# Patient Record
Sex: MALE | Race: WHITE | NOT HISPANIC OR LATINO | URBAN - METROPOLITAN AREA
[De-identification: names, ages, dates, MRNs, and addresses within clinical notes are randomized per-mention and may not be internally consistent; named-entity substitution may affect disease eponyms.]

---

## 2020-06-23 ENCOUNTER — OUTPATIENT (OUTPATIENT)
Dept: OUTPATIENT SERVICES | Facility: HOSPITAL | Age: 70
LOS: 1 days | End: 2020-06-23
Payer: MEDICARE

## 2020-06-23 ENCOUNTER — APPOINTMENT (OUTPATIENT)
Dept: CT IMAGING | Facility: CLINIC | Age: 70
End: 2020-06-23
Payer: MEDICARE

## 2020-06-23 DIAGNOSIS — Z00.8 ENCOUNTER FOR OTHER GENERAL EXAMINATION: ICD-10-CM

## 2020-06-23 PROCEDURE — 70486 CT MAXILLOFACIAL W/O DYE: CPT | Mod: 26

## 2020-06-23 PROCEDURE — 70486 CT MAXILLOFACIAL W/O DYE: CPT

## 2020-10-29 ENCOUNTER — LABORATORY RESULT (OUTPATIENT)
Age: 70
End: 2020-10-29

## 2020-10-29 ENCOUNTER — APPOINTMENT (OUTPATIENT)
Dept: OTOLARYNGOLOGY | Facility: CLINIC | Age: 70
End: 2020-10-29
Payer: MEDICARE

## 2020-10-29 VITALS
HEART RATE: 51 BPM | DIASTOLIC BLOOD PRESSURE: 73 MMHG | RESPIRATION RATE: 18 BRPM | WEIGHT: 175 LBS | SYSTOLIC BLOOD PRESSURE: 126 MMHG | BODY MASS INDEX: 27.47 KG/M2 | TEMPERATURE: 98 F | HEIGHT: 67 IN

## 2020-10-29 DIAGNOSIS — Z82.49 FAMILY HISTORY OF ISCHEMIC HEART DISEASE AND OTHER DISEASES OF THE CIRCULATORY SYSTEM: ICD-10-CM

## 2020-10-29 DIAGNOSIS — Z87.891 PERSONAL HISTORY OF NICOTINE DEPENDENCE: ICD-10-CM

## 2020-10-29 DIAGNOSIS — L02.01 CUTANEOUS ABSCESS OF FACE: ICD-10-CM

## 2020-10-29 DIAGNOSIS — Z85.46 PERSONAL HISTORY OF MALIGNANT NEOPLASM OF PROSTATE: ICD-10-CM

## 2020-10-29 DIAGNOSIS — I25.10 ATHEROSCLEROTIC HEART DISEASE OF NATIVE CORONARY ARTERY W/OUT ANGINA PECTORIS: ICD-10-CM

## 2020-10-29 DIAGNOSIS — R41.3 OTHER AMNESIA: ICD-10-CM

## 2020-10-29 PROCEDURE — 21550 BIOPSY OF NECK/CHEST: CPT

## 2020-10-29 PROCEDURE — 99204 OFFICE O/P NEW MOD 45 MIN: CPT | Mod: 25

## 2020-10-29 PROCEDURE — 99072 ADDL SUPL MATRL&STAF TM PHE: CPT

## 2020-10-29 RX ORDER — ASPIRIN 81 MG
81 TABLET, DELAYED RELEASE (ENTERIC COATED) ORAL
Refills: 0 | Status: ACTIVE | COMMUNITY

## 2020-10-29 RX ORDER — ATORVASTATIN CALCIUM 80 MG/1
TABLET, FILM COATED ORAL
Refills: 0 | Status: ACTIVE | COMMUNITY

## 2020-10-29 RX ORDER — EZETIMIBE 10 MG/1
10 TABLET ORAL
Refills: 0 | Status: ACTIVE | COMMUNITY

## 2020-10-29 RX ORDER — RAMIPRIL 5 MG/1
CAPSULE ORAL
Refills: 0 | Status: ACTIVE | COMMUNITY

## 2020-10-29 RX ORDER — PHENYTOIN SODIUM EXTENDED 100 MG
CAPSULE ORAL
Refills: 0 | Status: ACTIVE | COMMUNITY

## 2020-10-29 RX ORDER — CARVEDILOL 25 MG/1
TABLET, FILM COATED ORAL
Refills: 0 | Status: ACTIVE | COMMUNITY

## 2020-10-29 NOTE — HISTORY OF PRESENT ILLNESS
[de-identified] : 69 yro male referred by Dr. Cast for exposed anterior lower jaw bone. PT had dental work earlier this year and was losing his teeth. Pt was having new dentures made and did not get to f/u due to Covid, durong which time the bone was getting more exposed. 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 Able to tolerate soft foods. Denies dysphagia, dyspnea, dysphonia. PT with hx of prostate cancer 10 years ago, with Radiation. \par \par CT maxillofocial 6/23/20: partially healed anterior mandibular extraction sockets with a focal area of mild sclerosis in the left parasymphyseal region which may represent an extraction socket or mild osteonecrosis. Carious left lower molar. Mucosal thickening of the paranasal sinuses with nasal septal deviation to the right. \par

## 2020-10-29 NOTE — CONSULT LETTER
[Dear  ___] : Dear  [unfilled], [Consult Letter:] : I had the pleasure of evaluating your patient, [unfilled]. [Please see my note below.] : Please see my note below. [Consult Closing:] : Thank you very much for allowing me to participate in the care of this patient.  If you have any questions, please do not hesitate to contact me. [Sincerely,] : Sincerely, [FreeTextEntry2] : Dr Jasen aCst,Dr Felix Patterson ([phone]) [FreeTextEntry3] : \par Henry Monroy MD, FACS\par \par Otolaryngology-Head and Neck Surgery\par [employer] [facility]\par

## 2020-10-29 NOTE — PHYSICAL EXAM
[Midline] : trachea located in midline position [Normal] : no rashes [de-identified] : Submental skin lesion suggestive of fistula [de-identified] : Exposed alveolar bone of the anterior mandible.

## 2020-11-03 ENCOUNTER — NON-APPOINTMENT (OUTPATIENT)
Age: 70
End: 2020-11-03

## 2020-11-06 ENCOUNTER — OUTPATIENT (OUTPATIENT)
Dept: OUTPATIENT SERVICES | Facility: HOSPITAL | Age: 70
LOS: 1 days | End: 2020-11-06
Payer: MEDICARE

## 2020-11-06 ENCOUNTER — APPOINTMENT (OUTPATIENT)
Dept: CT IMAGING | Facility: CLINIC | Age: 70
End: 2020-11-06
Payer: MEDICARE

## 2020-11-06 DIAGNOSIS — Z00.8 ENCOUNTER FOR OTHER GENERAL EXAMINATION: ICD-10-CM

## 2020-11-06 PROCEDURE — 70487 CT MAXILLOFACIAL W/DYE: CPT

## 2020-11-06 PROCEDURE — 70487 CT MAXILLOFACIAL W/DYE: CPT | Mod: 26

## 2020-11-06 PROCEDURE — 82565 ASSAY OF CREATININE: CPT

## 2020-11-19 ENCOUNTER — APPOINTMENT (OUTPATIENT)
Dept: OTOLARYNGOLOGY | Facility: CLINIC | Age: 70
End: 2020-11-19
Payer: MEDICARE

## 2020-11-19 PROCEDURE — 99214 OFFICE O/P EST MOD 30 MIN: CPT

## 2020-11-19 NOTE — PHYSICAL EXAM
[Midline] : trachea located in midline position [Normal] : no rashes [de-identified] : Submental skin lesion and swelling resolved.  [de-identified] : Exposed alveolar bone of the anterior mandible.

## 2020-11-19 NOTE — CONSULT LETTER
[Dear  ___] : Dear  [unfilled], [Courtesy Letter:] : I had the pleasure of seeing your patient, [unfilled], in my office today. [Please see my note below.] : Please see my note below. [Consult Closing:] : Thank you very much for allowing me to participate in the care of this patient.  If you have any questions, please do not hesitate to contact me. [Sincerely,] : Sincerely, [FreeTextEntry2] : Dr Jasen Cast [FreeTextEntry3] : \par Henry Monroy MD, FACS\par \par Otolaryngology-Head and Neck Surgery\par [employer] [facility]\par

## 2020-12-09 ENCOUNTER — APPOINTMENT (OUTPATIENT)
Dept: INFECTIOUS DISEASE | Facility: CLINIC | Age: 70
End: 2020-12-09

## 2021-02-18 ENCOUNTER — APPOINTMENT (OUTPATIENT)
Dept: OTOLARYNGOLOGY | Facility: CLINIC | Age: 71
End: 2021-02-18
Payer: MEDICARE

## 2021-02-18 VITALS
WEIGHT: 183 LBS | BODY MASS INDEX: 28.72 KG/M2 | HEART RATE: 67 BPM | HEIGHT: 67 IN | SYSTOLIC BLOOD PRESSURE: 135 MMHG | DIASTOLIC BLOOD PRESSURE: 76 MMHG

## 2021-02-18 PROCEDURE — 99214 OFFICE O/P EST MOD 30 MIN: CPT

## 2021-02-18 NOTE — PHYSICAL EXAM
[Midline] : trachea located in midline position [Normal] : no rashes [de-identified] : Submental skin lesion and swelling resolved.  [de-identified] : Exposed alveolar bone of the anterior mandible.

## 2021-02-18 NOTE — HISTORY OF PRESENT ILLNESS
[de-identified] : 70 yro male referred by Dr. Cast for exposed anterior lower jaw bone. PT had dental work earlier this year and was losing his teeth. Pt was having new dentures made and did not get to f/u due to Covid, during which time the bone was getting more exposed. 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 Able to tolerate soft foods . Denies dysphagia, dyspnea, dysphonia. PT with hx of prostate cancer 10 years ago, with Radiation. \par Was started on abx with improvement of the swelling. No more drainage. Biopsy of the granulation tissue was negative.\par pt is being f/u with Dr. Cast now and last visit was one month ago and next appt Monday. pt c.o no bad smell, no teeth and trouble chewing. pt did not see ID due to issue with insurance. no fever, no wt loss no swelling.\par pt has hx of prostate cancer  was treated with radiation and now recurrent  on chemo 4/6.\par Complete review of systems which was performed during a previous encounter was reviewed with the patient and there are no changes except as stated in the HPI section.\par \par \par CT maxillofocial 6/23/20: partially healed anterior mandibular extraction sockets with a focal area of mild sclerosis in the left parasymphyseal region which may represent an extraction socket or mild osteonecrosis. Carious left lower molar. Mucosal thickening of the paranasal sinuses with nasal septal deviation to the right. \par

## 2021-02-18 NOTE — CONSULT LETTER
[Dear  ___] : Dear  [unfilled], [Courtesy Letter:] : I had the pleasure of seeing your patient, [unfilled], in my office today. [Please see my note below.] : Please see my note below. [Consult Closing:] : Thank you very much for allowing me to participate in the care of this patient.  If you have any questions, please do not hesitate to contact me. [Sincerely,] : Sincerely, [FreeTextEntry2] : Dr Jasen Cast,Dr Felix Patterson ([phone])  [FreeTextEntry3] : \par Henry Monroy MD, FACS\par \par Otolaryngology-Head and Neck Surgery\par [employer] [facility]\par

## 2021-03-10 ENCOUNTER — APPOINTMENT (OUTPATIENT)
Dept: INFECTIOUS DISEASE | Facility: CLINIC | Age: 71
End: 2021-03-10
Payer: MEDICARE

## 2021-03-10 VITALS
DIASTOLIC BLOOD PRESSURE: 73 MMHG | BODY MASS INDEX: 29.51 KG/M2 | TEMPERATURE: 74 F | HEIGHT: 67 IN | SYSTOLIC BLOOD PRESSURE: 147 MMHG | OXYGEN SATURATION: 97 % | HEART RATE: 74 BPM | WEIGHT: 188 LBS | RESPIRATION RATE: 16 BRPM
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
## 2021-03-10 PROCEDURE — 99203 OFFICE O/P NEW LOW 30 MIN: CPT

## 2021-03-10 NOTE — CONSULT LETTER
[Dear  ___] : Dear  [unfilled], [( Thank you for referring [unfilled] for consultation for _____ )] : Thank you for referring [unfilled] for consultation for [unfilled] [Please see my note below.] : Please see my note below. [FreeTextEntry2] : Dr. Henry Monroy [FreeTextEntry3] : Thank you for allowing me to participate in the care of this patient.  Please feel free to contact me with any questions.\par \par Sincerely, \par Michael I. Oppenheim, MD\par \par  [DreJssica  ___] : Dr. VILLARREAL

## 2021-03-10 NOTE — HISTORY OF PRESENT ILLNESS
[FreeTextEntry1] : 70 year old with history of prostate cancer with known bony metastases, had been receiving XGeva for 1-1.5 years because of bony mets, but began developing tooth infections and loose teeth. \par Had some extractions and was supposed to get new dentures in early 2020 but never got due to COVID. \par First noted exposed bone of anterior mandible in the period between March and June. Also developed odor from mouth.   \par In late 2020, developed soft tissue swelling on submental area last 2020; spontaneously drained yellow material per patient. Initially saw Dr. Cast (OMFS), referred to Dr. Monroy (ENT), bx done which showed acute inflammation and granulation. Treated with Augmentin x 3 weeks with some resolution of soft tissue swelling.\par \par Currently onTaxotere for Prostate Ca since end of 2020 for high PSA. Known bone mets. Last bone scan was 1 years ago - told ribs and back, no mention of jaw. \par \par CT 11/6/20: multiple areas of lucency/sclerosis, worsened c/w 6/2020 scan\par \par Patient currently afebrile, no systemic symptoms. \par \par \par \par

## 2021-03-10 NOTE — ASSESSMENT
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 Patient remains with persistently exposed bone in mouth which makes it impossible to sterilize the bone, and patient will likely continue to have smoldering chronic osteomyelitis until some intervention to provide soft tissue coverage and vascular supply over exposed. For now, will re-start Augmenting x 6 week course to treat any residual acute OM components, but will need to discuss with Dr. Cast what procedures patient might be offered to provide tissue coverage over the bone, after which can consider aggressive course of treatment to attempt to sterilize the bone.

## 2021-03-10 NOTE — PHYSICAL EXAM
[General Appearance - Alert] : alert [General Appearance - In No Acute Distress] : in no acute distress [Sclera] : the sclera and conjunctiva were normal [FreeTextEntry1] : Edentulous maxillary except some molar remnants. Anterior mandible visible ~ 3 mm exposed. No purulence noted. Firm / indurated submental soft tissues, non-tender, no overlying erythema. [Neck Appearance] : the appearance of the neck was normal [Neck Cervical Mass (___cm)] : no neck mass was observed [Auscultation Breath Sounds / Voice Sounds] : lungs were clear to auscultation bilaterally [Heart Rate And Rhythm] : heart rate was normal and rhythm regular [Heart Sounds] : normal S1 and S2 [Heart Sounds Gallop] : no gallops [Murmurs] : no murmurs [Heart Sounds Pericardial Friction Rub] : no pericardial rub [Bowel Sounds] : normal bowel sounds [Abdomen Soft] : soft [Abdomen Tenderness] : non-tender [] : no hepato-splenomegaly [Abdomen Mass (___ Cm)] : no abdominal mass palpated [No Palpable Adenopathy] : no palpable adenopathy

## 2021-03-10 NOTE — REASON FOR VISIT
[Consultation] : a consultation visit [Spouse] : spouse [FreeTextEntry1] : Mandibular osteonecrosis / osteomyelitis?

## 2021-04-10 ENCOUNTER — APPOINTMENT (OUTPATIENT)
Dept: CT IMAGING | Facility: CLINIC | Age: 71
End: 2021-04-10
Payer: MEDICARE

## 2021-04-10 ENCOUNTER — OUTPATIENT (OUTPATIENT)
Dept: OUTPATIENT SERVICES | Facility: HOSPITAL | Age: 71
LOS: 1 days | End: 2021-04-10
Payer: MEDICARE

## 2021-04-10 DIAGNOSIS — M87.180 OSTEONECROSIS DUE TO DRUGS, JAW: ICD-10-CM

## 2021-04-10 PROCEDURE — 70486 CT MAXILLOFACIAL W/O DYE: CPT

## 2021-04-10 PROCEDURE — 70486 CT MAXILLOFACIAL W/O DYE: CPT | Mod: 26,MH

## 2021-04-14 ENCOUNTER — APPOINTMENT (OUTPATIENT)
Dept: INFECTIOUS DISEASE | Facility: CLINIC | Age: 71
End: 2021-04-14
Payer: MEDICARE

## 2021-04-14 VITALS
SYSTOLIC BLOOD PRESSURE: 137 MMHG | HEIGHT: 67 IN | HEART RATE: 67 BPM | DIASTOLIC BLOOD PRESSURE: 78 MMHG | WEIGHT: 195 LBS | OXYGEN SATURATION: 100 % | TEMPERATURE: 99.1 F | BODY MASS INDEX: 30.61 KG/M2 | RESPIRATION RATE: 16 BRPM

## 2021-04-14 PROCEDURE — 99212 OFFICE O/P EST SF 10 MIN: CPT

## 2021-04-14 NOTE — HISTORY OF PRESENT ILLNESS
[FreeTextEntry1] : Completed Taxotere 2 weeks ago.\par Wife notes that malodorous breath cleared up since patient on Amox/clavulanic acid (week 5). \par Mandible still exposed per patient.\par Had maxillofacial CT (ordered by Dr. Cast) on 4/10:\par "Compared to immediate prior study, overall slightly worsened appearance of the anterior bilateral mandible heterogeneous and linear lytic changes, most notably involving the left lingual cortex and mandibular symphysis. Differential again favors chronic osteomyelitis and/or osteonecrosis.  Redemonstrated left submental region fistulous tract."\par \par No fever, no chills. \par No GI upset or diarrhea, if anything is constipated.\par Patient reporting balanitis on the Augmentin - given cream by pharmacy. Reports it improved after 1 week so cream stopped (unsure of name), recurred after stopped cream)

## 2021-04-14 NOTE — ASSESSMENT
[FreeTextEntry1] : Some clinical improvement based on wife's perception of improvement in malodorous character of mouth, but CT progression of bone destruction while on oral Augmentin.\par \par [place] Cast [place] [place] Dr. Cast (who has not reviwed CT yet), possibility of resection of sequestrum and then primary closure over mandible. Advised that if this is possible, would consider aggressive IV therapy perioperatively to provide maximal likelihood of success and attempt to sterilize bone. If bone will remain exposed, best that can be offered from ID perspective would be brief courses of oral antibiotics when acute flare of OM on top of chronic OM which cannot be definitively addressed. \par \par Patient to see Dr. Cast next week and will confer after that evaluation.

## 2021-04-14 NOTE — PHYSICAL EXAM
[General Appearance - Alert] : alert [General Appearance - In No Acute Distress] : in no acute distress [General Appearance - Well Nourished] : well nourished [General Appearance - Well-Appearing] : healthy appearing [FreeTextEntry1] : Uncircumcised penis with some erythema of glans with retraction of foreskin

## 2021-05-06 ENCOUNTER — APPOINTMENT (OUTPATIENT)
Dept: OTOLARYNGOLOGY | Facility: CLINIC | Age: 71
End: 2021-05-06
Payer: MEDICARE

## 2021-05-06 PROCEDURE — 99072 ADDL SUPL MATRL&STAF TM PHE: CPT

## 2021-05-06 PROCEDURE — 99214 OFFICE O/P EST MOD 30 MIN: CPT

## 2021-05-06 RX ORDER — AMOXICILLIN AND CLAVULANATE POTASSIUM 875; 125 MG/1; MG/1
875-125 TABLET, COATED ORAL
Qty: 84 | Refills: 0 | Status: COMPLETED | COMMUNITY
Start: 2020-10-30 | End: 2021-05-06

## 2021-05-06 NOTE — HISTORY OF PRESENT ILLNESS
[de-identified] : 70 yro male referred by Dr. Cast for exposed anterior lower jaw bone. Pt had dental work earlier this year and was losing his teeth. Pt was having new dentures made and did not get to f/u due to Covid, during which time the bone was getting more exposed. 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 Today pt denies pain, fever, chills, weight loss. Able to tolerate soft foods, but it is getting more difficult to chew.  Denies bleeding/drainage, dysphagia, dyspnea, dysphonia.  \par Biopsy of the granulation tissue was negative.\par Pt saw Dr. Cast 4/21/21.\par pt has hx of prostate cancer  was treated with radiation and now recurrent, completed Chemo 3/23/21. \par Complete review of systems which was performed during a previous encounter was reviewed with the patient and there are no changes except as stated in the HPI section.\par

## 2021-05-06 NOTE — REASON FOR VISIT
[Subsequent Evaluation] : a subsequent evaluation for [Spouse] : spouse [FreeTextEntry2] : osteonecrosis of mandible

## 2021-05-06 NOTE — PHYSICAL EXAM
[de-identified] : Submental skin lesion and swelling resolved.  [Midline] : trachea located in midline position [de-identified] : Exposed alveolar bone of the anterior mandible. [Normal] : no rashes

## 2021-06-16 ENCOUNTER — APPOINTMENT (OUTPATIENT)
Dept: INFECTIOUS DISEASE | Facility: CLINIC | Age: 71
End: 2021-06-16
Payer: MEDICARE

## 2021-06-16 VITALS
DIASTOLIC BLOOD PRESSURE: 83 MMHG | BODY MASS INDEX: 28.56 KG/M2 | TEMPERATURE: 98.7 F | SYSTOLIC BLOOD PRESSURE: 123 MMHG | WEIGHT: 182 LBS | HEIGHT: 67 IN | HEART RATE: 61 BPM | OXYGEN SATURATION: 99 %

## 2021-06-16 PROCEDURE — 99212 OFFICE O/P EST SF 10 MIN: CPT

## 2021-06-16 RX ORDER — AMOXICILLIN AND CLAVULANATE POTASSIUM 875; 125 MG/1; MG/1
875-125 TABLET, COATED ORAL
Qty: 60 | Refills: 1 | Status: ACTIVE | COMMUNITY
Start: 2021-06-16 | End: 1900-01-01

## 2021-06-16 NOTE — PHYSICAL EXAM
[General Appearance - Alert] : alert [General Appearance - Well Nourished] : well nourished [General Appearance - Well-Appearing] : healthy appearing [FreeTextEntry1] : Anterior mandible upper 2 cm missing; remaining mandibular surface completely covered by oral mucos. No purulence. No tenderness of surface.

## 2021-06-16 NOTE — ASSESSMENT
[FreeTextEntry1] : Spontaneous disconnection of large area of mandible that previously was exposed. Residual mandible now covered with oral mucosa. Given likelihood of remaining bone having become colonized/infected, would provide antimicrobial therapy to support healing of mucosa over bone and attempt to sterilize any smouldering infection now that is closed. \par \par Will discuss with Dr. Cast re: findings on X-ray and consideration of follow-up X-Ray in the future to follow clinical progress.\par

## 2021-06-16 NOTE — HISTORY OF PRESENT ILLNESS
[FreeTextEntry1] : Contacted by Dr. Cast that exposed bone removed in office today after spontaneously disconnecting.\par Patient presents for evaluation & plan. \par No complains, feels well. \par Panoramic x-ray reportedly performed.

## 2021-07-14 ENCOUNTER — APPOINTMENT (OUTPATIENT)
Dept: INFECTIOUS DISEASE | Facility: CLINIC | Age: 71
End: 2021-07-14
Payer: MEDICARE

## 2021-07-14 VITALS
HEIGHT: 67 IN | WEIGHT: 180 LBS | SYSTOLIC BLOOD PRESSURE: 127 MMHG | OXYGEN SATURATION: 98 % | DIASTOLIC BLOOD PRESSURE: 74 MMHG | TEMPERATURE: 98.9 F | HEART RATE: 56 BPM | BODY MASS INDEX: 28.25 KG/M2

## 2021-07-14 PROCEDURE — 99212 OFFICE O/P EST SF 10 MIN: CPT

## 2021-07-14 NOTE — PHYSICAL EXAM
[General Appearance - Alert] : alert [General Appearance - In No Acute Distress] : in no acute distress [FreeTextEntry1] : No masses, no cervical JG

## 2021-07-14 NOTE — HISTORY OF PRESENT ILLNESS
[FreeTextEntry1] : Per oncologist, needs restart of prostate ca treatment (Cabazitaxel)\par Completing 4 weeks Augmentin\par Sees Dr. Cast next week\par No fever, chills, no mouth pain.\par \par

## 2021-07-14 NOTE — ASSESSMENT
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 Cast next week. If no signs of infection after being off augmentin, OK to start chemotherapy. \par Photo of patient's mouth sent to Dr. Cast via Teams.\par

## 2021-08-11 ENCOUNTER — APPOINTMENT (OUTPATIENT)
Dept: INFECTIOUS DISEASE | Facility: CLINIC | Age: 71
End: 2021-08-11
Payer: MEDICARE

## 2021-08-11 VITALS
SYSTOLIC BLOOD PRESSURE: 122 MMHG | HEIGHT: 67 IN | OXYGEN SATURATION: 97 % | WEIGHT: 184 LBS | HEART RATE: 56 BPM | DIASTOLIC BLOOD PRESSURE: 55 MMHG | TEMPERATURE: 98.8 F | BODY MASS INDEX: 28.88 KG/M2

## 2021-08-11 DIAGNOSIS — M87.9 OSTEONECROSIS, UNSPECIFIED: ICD-10-CM

## 2021-08-11 PROCEDURE — 99212 OFFICE O/P EST SF 10 MIN: CPT

## 2021-08-11 NOTE — HISTORY OF PRESENT ILLNESS
[FreeTextEntry1] : Only began chemotherapy today\par No changes or complains around mouth\par Seeing Dr. Cast (dental) regularly\par Neulasta pump in place.

## 2021-08-11 NOTE — ASSESSMENT
[FreeTextEntry1] : No evidence of residual infection in area where mandible fragment dehisced. \par Beginning chemotherapy now. \par Is being followed closely by dental services.\par Advised to follow for signs of infection in mouth (pain, inflammation of gums, darkening of protruding bone).\par F/U PRN should symptoms develop or if dental feels infection is occuring.

## 2022-06-29 ENCOUNTER — RESULT REVIEW (OUTPATIENT)
Age: 72
End: 2022-06-29

## 2023-10-16 ENCOUNTER — APPOINTMENT (OUTPATIENT)
Age: 73
End: 2023-10-16
Payer: MEDICARE

## 2023-10-16 PROCEDURE — 99024 POSTOP FOLLOW-UP VISIT: CPT

## 2024-04-15 ENCOUNTER — APPOINTMENT (OUTPATIENT)
Age: 74
End: 2024-04-15
Payer: MEDICARE

## 2024-04-15 PROCEDURE — 99212 OFFICE O/P EST SF 10 MIN: CPT

## 2024-04-15 PROCEDURE — 70355 PANORAMIC X-RAY OF JAWS: CPT | Mod: 26

## 2024-10-14 ENCOUNTER — APPOINTMENT (OUTPATIENT)
Age: 74
End: 2024-10-14